# Patient Record
Sex: FEMALE | Race: WHITE | NOT HISPANIC OR LATINO | Employment: STUDENT | URBAN - METROPOLITAN AREA
[De-identification: names, ages, dates, MRNs, and addresses within clinical notes are randomized per-mention and may not be internally consistent; named-entity substitution may affect disease eponyms.]

---
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
## 2025-01-07 DIAGNOSIS — L40.0 PSORIASIS VULGARIS: ICD-10-CM

## 2025-01-07 RX ORDER — HYDROCORTISONE 25 MG/G
CREAM TOPICAL AS NEEDED
Qty: 28 G | Refills: 11 | Status: SHIPPED | OUTPATIENT
Start: 2025-01-07 | End: 2025-01-07 | Stop reason: SDUPTHER

## 2025-01-07 RX ORDER — HYDROCORTISONE 25 MG/G
CREAM TOPICAL AS NEEDED
Qty: 28 G | Refills: 11 | Status: SHIPPED | OUTPATIENT
Start: 2025-01-07

## 2025-01-27 DIAGNOSIS — L40.0 PSORIASIS VULGARIS: ICD-10-CM

## 2025-01-28 RX ORDER — USTEKINUMAB 45 MG/.5ML
INJECTION, SOLUTION SUBCUTANEOUS
Qty: 0.5 ML | Refills: 4 | Status: SHIPPED | OUTPATIENT
Start: 2025-01-28

## 2025-02-07 DIAGNOSIS — L40.0 PSORIASIS VULGARIS: ICD-10-CM

## 2025-02-21 RX ORDER — TACROLIMUS 1 MG/G
OINTMENT TOPICAL 2 TIMES DAILY
Qty: 30 G | Refills: 3 | Status: SHIPPED | OUTPATIENT
Start: 2025-02-21 | End: 2026-02-21

## 2025-02-26 DIAGNOSIS — L40.0 PSORIASIS VULGARIS: ICD-10-CM

## 2025-02-26 RX ORDER — USTEKINUMAB 45 MG/.5ML
45 INJECTION, SOLUTION SUBCUTANEOUS
Qty: 0.5 ML | Refills: 2 | Status: SHIPPED | OUTPATIENT
Start: 2025-02-26

## 2025-05-12 ASSESSMENT — DERMATOLOGY QUALITY OF LIFE (QOL) ASSESSMENT
RATE HOW BOTHERED YOU ARE BY SYMPTOMS OF YOUR SKIN PROBLEM (EG, ITCHING, STINGING BURNING, HURTING OR SKIN IRRITATION): 3
RATE HOW EMOTIONALLY BOTHERED YOU ARE BY YOUR SKIN PROBLEM (FOR EXAMPLE, WORRY, EMBARRASSMENT, FRUSTRATION): 0 - NEVER BOTHERED
RATE HOW EMOTIONALLY BOTHERED YOU ARE BY YOUR SKIN PROBLEM (FOR EXAMPLE, WORRY, EMBARRASSMENT, FRUSTRATION): 0 - NEVER BOTHERED
RATE HOW BOTHERED YOU ARE BY SYMPTOMS OF YOUR SKIN PROBLEM (EG, ITCHING, STINGING BURNING, HURTING OR SKIN IRRITATION): 3
RATE HOW BOTHERED YOU ARE BY EFFECTS OF YOUR SKIN PROBLEMS ON YOUR ACTIVITIES (EG, GOING OUT, ACCOMPLISHING WHAT YOU WANT, WORK ACTIVITIES OR YOUR RELATIONSHIPS WITH OTHERS): 1
RATE HOW BOTHERED YOU ARE BY EFFECTS OF YOUR SKIN PROBLEMS ON YOUR ACTIVITIES (EG, GOING OUT, ACCOMPLISHING WHAT YOU WANT, WORK ACTIVITIES OR YOUR RELATIONSHIPS WITH OTHERS): 1
WHAT SINGLE SKIN CONDITION LISTED BELOW IS THE PATIENT ANSWERING THE QUALITY-OF-LIFE ASSESSMENT QUESTIONS ABOUT: PSORIASIS
WHAT SINGLE SKIN CONDITION LISTED BELOW IS THE PATIENT ANSWERING THE QUALITY-OF-LIFE ASSESSMENT QUESTIONS ABOUT: PSORIASIS

## 2025-05-12 ASSESSMENT — PATIENT GLOBAL ASSESSMENT (PGA): WHAT IS THE PGA: PATIENT GLOBAL ASSESSMENT:  2 - MILD

## 2025-05-14 ENCOUNTER — APPOINTMENT (OUTPATIENT)
Dept: DERMATOLOGY | Facility: CLINIC | Age: 20
End: 2025-05-14
Payer: COMMERCIAL

## 2025-05-14 DIAGNOSIS — L40.0 PSORIASIS VULGARIS: Primary | ICD-10-CM

## 2025-05-14 RX ORDER — USTEKINUMAB 45 MG/.5ML
45 INJECTION, SOLUTION SUBCUTANEOUS
Qty: 0.5 ML | Refills: 3 | Status: SHIPPED | OUTPATIENT
Start: 2025-05-14

## 2025-05-14 NOTE — Clinical Note
Psoriasis Vulgaris  -The chronic and intermittently flaring nature of this skin condition was discussed with the patient today.Discussed this is an autoinflammatory condition with a genetic predisposition that can be associated with inflammatory arthritis and increased risk of cardiovascular disease.  The various treatment options were reviewed with the patient including topical steroids, topical retinoids, phototherapy, systemic medications including oral medications and injectable (biologic) medications.  - Continues on Stelara 45 mg subcutaneous q11-12 weeks and doing very well. Refilled sent. Also discussed the Stelara has recently become biosimilar.   - Continues to use topical steroids (clobetasol 0.05% ointment) as needed for skin  -  Continue clobetasol 0.05% solution to use BID for pruritic, scaly areas in scalp. Order placed  - Continue to use ketoconazole 2% shampoo as needed for scalp pruritus   - T spot negative in 2024. New order placed

## 2025-05-14 NOTE — PROGRESS NOTES
Virtual or Telephone Consent    An interactive audio and video telecommunication system which permits real time communications between the patient (at the originating site) and provider (at the distant site) was utilized to provide this telehealth service.   Verbal consent was requested and obtained from Mela Werner on this date, 05/16/25 for a telehealth visit and the patient's location was confirmed at the time of the visit.     Subjective     Mela Werner is a 19 y.o. female who presents for the following: Psoriasis.     Doing great and is well controlled on a Stelara. Minor flares right before next injection. No further refills needed for topicals.     Review of Systems:  No other skin or systemic complaints other than what is documented elsewhere in the note.    The following portions of the chart were reviewed this encounter and updated as appropriate:          Skin Cancer History  Biopsy Log Book  No skin cancers from Specimen Tracking.    Additional History      Specialty Problems    None       Objective   Well appearing patient in no apparent distress; mood and affect are within normal limits.    A focused skin examination was performed. All findings within normal limits unless otherwise noted below.    Small psoriasiform plaque on right medial knee, and right second digit       Assessment/Plan   PSORIASIS VULGARIS  Generalized  Psoriasis Vulgaris  -The chronic and intermittently flaring nature of this skin condition was discussed with the patient today.Discussed this is an autoinflammatory condition with a genetic predisposition that can be associated with inflammatory arthritis and increased risk of cardiovascular disease.  The various treatment options were reviewed with the patient including topical steroids, topical retinoids, phototherapy, systemic medications including oral medications and injectable (biologic) medications.  - Continues on Stelara 45 mg subcutaneous q11-12 weeks and doing very  well. Refilled sent. Also discussed the Stelara has recently become biosimilar.   - Continues to use topical steroids (clobetasol 0.05% ointment) as needed for skin  -  Continue clobetasol 0.05% solution to use BID for pruritic, scaly areas in scalp. Order placed  - Continue to use ketoconazole 2% shampoo as needed for scalp pruritus   - T spot negative in 2024. New order placed  Related Procedures  T-SPOT (Tb)  Follow Up In Dermatology - Established Patient  Related Medications  ketoconazole (NIZOral) 2 % shampoo  Apply topically every other day. Use on scalp every other day as tolerated.  hydrocortisone 2.5 % cream  Apply topically if needed for rash. To upper lip.  tacrolimus (Protopic) 0.1 % ointment  Apply topically 2 times a day. To affected skin on the face.  ustekinumab (Stelara) injection  Inject 0.5 mL (45 mg) under the skin every 12 weeks.     Follow up in 1 year for Psoriasis     My DO Maryanne, PGY-4  Department of Dermatology    I saw and evaluated the patient. I personally obtained the key and critical portions of the history and physical exam or was physically present for key and critical portions performed by the resident/fellow. I reviewed the resident/fellow's documentation and discussed the patient with the resident/fellow. I agree with the resident/fellow's medical decision making as documented in the note.    Gabriel Bautista MD PhD